# Patient Record
Sex: MALE | Race: AMERICAN INDIAN OR ALASKA NATIVE
[De-identification: names, ages, dates, MRNs, and addresses within clinical notes are randomized per-mention and may not be internally consistent; named-entity substitution may affect disease eponyms.]

---

## 2019-01-15 ENCOUNTER — HOSPITAL ENCOUNTER (INPATIENT)
Dept: HOSPITAL 5 - ED | Age: 29
LOS: 3 days | Discharge: HOME | DRG: 378 | End: 2019-01-18
Attending: INTERNAL MEDICINE | Admitting: INTERNAL MEDICINE
Payer: SELF-PAY

## 2019-01-15 DIAGNOSIS — R19.7: ICD-10-CM

## 2019-01-15 DIAGNOSIS — K92.0: Primary | ICD-10-CM

## 2019-01-15 DIAGNOSIS — B19.10: ICD-10-CM

## 2019-01-15 PROCEDURE — 85610 PROTHROMBIN TIME: CPT

## 2019-01-15 PROCEDURE — C9113 INJ PANTOPRAZOLE SODIUM, VIA: HCPCS

## 2019-01-15 PROCEDURE — 93010 ELECTROCARDIOGRAM REPORT: CPT

## 2019-01-15 PROCEDURE — 93005 ELECTROCARDIOGRAM TRACING: CPT

## 2019-01-15 PROCEDURE — 83690 ASSAY OF LIPASE: CPT

## 2019-01-15 PROCEDURE — 85025 COMPLETE CBC W/AUTO DIFF WBC: CPT

## 2019-01-15 PROCEDURE — 86707 HEPATITIS BE ANTIBODY: CPT

## 2019-01-15 PROCEDURE — 85018 HEMOGLOBIN: CPT

## 2019-01-15 PROCEDURE — 81001 URINALYSIS AUTO W/SCOPE: CPT

## 2019-01-15 PROCEDURE — 74177 CT ABD & PELVIS W/CONTRAST: CPT

## 2019-01-15 PROCEDURE — 36415 COLL VENOUS BLD VENIPUNCTURE: CPT

## 2019-01-15 PROCEDURE — 87806 HIV AG W/HIV1&2 ANTB W/OPTIC: CPT

## 2019-01-15 PROCEDURE — 86705 HEP B CORE ANTIBODY IGM: CPT

## 2019-01-15 PROCEDURE — 80053 COMPREHEN METABOLIC PANEL: CPT

## 2019-01-15 PROCEDURE — 80074 ACUTE HEPATITIS PANEL: CPT

## 2019-01-15 PROCEDURE — 80320 DRUG SCREEN QUANTALCOHOLS: CPT

## 2019-01-15 PROCEDURE — 99406 BEHAV CHNG SMOKING 3-10 MIN: CPT

## 2019-01-15 PROCEDURE — G0480 DRUG TEST DEF 1-7 CLASSES: HCPCS

## 2019-01-15 PROCEDURE — 85014 HEMATOCRIT: CPT

## 2019-01-15 PROCEDURE — 87350 HEPATITIS BE AG IA: CPT

## 2019-01-15 PROCEDURE — 82550 ASSAY OF CK (CPK): CPT

## 2019-01-15 PROCEDURE — 87517 HEPATITIS B DNA QUANT: CPT

## 2019-01-16 LAB
ALBUMIN SERPL-MCNC: 3.3 G/DL (ref 3.9–5)
ALBUMIN SERPL-MCNC: 3.6 G/DL (ref 3.9–5)
ALT SERPL-CCNC: 1432 UNITS/L (ref 7–56)
ALT SERPL-CCNC: 1514 UNITS/L (ref 7–56)
BASOPHILS # (AUTO): 0.1 K/MM3 (ref 0–0.1)
BASOPHILS NFR BLD AUTO: 0.6 % (ref 0–1.8)
BILIRUB UR QL STRIP: (no result)
BLOOD UR QL VISUAL: (no result)
BUN SERPL-MCNC: 7 MG/DL (ref 9–20)
BUN SERPL-MCNC: 9 MG/DL (ref 9–20)
BUN/CREAT SERPL: 13 %
BUN/CREAT SERPL: 9 %
CALCIUM SERPL-MCNC: 8.3 MG/DL (ref 8.4–10.2)
CALCIUM SERPL-MCNC: 8.9 MG/DL (ref 8.4–10.2)
EOSINOPHIL # BLD AUTO: 0.4 K/MM3 (ref 0–0.4)
EOSINOPHIL NFR BLD AUTO: 4.5 % (ref 0–4.3)
HCT VFR BLD CALC: 36.9 % (ref 35.5–45.6)
HCT VFR BLD CALC: 39.2 % (ref 35.5–45.6)
HCT VFR BLD CALC: 41 % (ref 35.5–45.6)
HEMOLYSIS INDEX: 22
HEMOLYSIS INDEX: 8
HGB BLD-MCNC: 12.5 GM/DL (ref 11.8–15.2)
HGB BLD-MCNC: 13.1 GM/DL (ref 11.8–15.2)
HGB BLD-MCNC: 13.5 GM/DL (ref 11.8–15.2)
INR PPP: 1.18 (ref 0.87–1.13)
LYMPHOCYTES # BLD AUTO: 2.4 K/MM3 (ref 1.2–5.4)
LYMPHOCYTES NFR BLD AUTO: 27.3 % (ref 13.4–35)
MCHC RBC AUTO-ENTMCNC: 33 % (ref 32–34)
MCV RBC AUTO: 99 FL (ref 84–94)
MONOCYTES # (AUTO): 1.3 K/MM3 (ref 0–0.8)
MONOCYTES % (AUTO): 15.2 % (ref 0–7.3)
MUCOUS THREADS #/AREA URNS HPF: (no result) /HPF
PH UR STRIP: 7 [PH] (ref 5–7)
PLATELET # BLD: 272 K/MM3 (ref 140–440)
PROT UR STRIP-MCNC: (no result) MG/DL
RBC # BLD AUTO: 4.16 M/MM3 (ref 3.65–5.03)
RBC #/AREA URNS HPF: 3 /HPF (ref 0–6)
UROBILINOGEN UR-MCNC: 4 MG/DL (ref ?–2)
WBC #/AREA URNS HPF: 4 /HPF (ref 0–6)

## 2019-01-16 RX ADMIN — PANTOPRAZOLE SODIUM SCH MG: 40 INJECTION, POWDER, FOR SOLUTION INTRAVENOUS at 10:43

## 2019-01-16 RX ADMIN — MORPHINE SULFATE PRN MG: 4 INJECTION, SOLUTION INTRAMUSCULAR; INTRAVENOUS at 10:43

## 2019-01-16 RX ADMIN — PANTOPRAZOLE SODIUM SCH MG: 40 INJECTION, POWDER, FOR SOLUTION INTRAVENOUS at 21:09

## 2019-01-16 RX ADMIN — MORPHINE SULFATE PRN MG: 4 INJECTION, SOLUTION INTRAMUSCULAR; INTRAVENOUS at 21:09

## 2019-01-16 NOTE — CAT SCAN REPORT
FINAL REPORT



EXAM:  CT ABDOMEN PELVIS W CON



HISTORY:  epigastric pain 



TECHNIQUE:  Routine axial imaging was obtained of the abdomen and pelvis following the intravenous in
jection of 100 cc of Omnipaque 300. Delayed imaging was obtained through the kidneys ureters and blad
maribel. Sagittal and coronal reconstructions were reviewed.



FINDINGS:  

The lung bases are clear. Pleural fluid is not seen.



The liver, gallbladder, biliary tree, pancreas, spleen, and adrenal glands appear normal. The kidneys
 reveal an 8 millimeter cortical cyst in the upper pole of left kidney. There is no evidence of hydro
nephrosis. The abdominal aorta is normal in caliber. The portal vein enhances normally. The bowel loo
ps are normal in caliber and course. The appendix is not seen. There is no evidence of free fluid or 
adenopathy. In the pelvis the prostate gland and bladder appear normal. The skeletal structures appea
r well maintained.



IMPRESSION:  

No acute process in the abdomen and pelvis.



Appendix not identified. No evidence of any inflammatory process in the right lower quadrant.



Small cortical cyst upper pole left kidney.

## 2019-01-16 NOTE — GASTROENTEROLOGY CONSULTATION
Addendum entered and electronically signed by NEHA REYES MD  01/16/19 18:06: 





Patient seen and examined on1/16/2018. Agree with A/P and recommendations as 

stated. 


27 yo male with no pmh presenting with diffuse abdominal pain and 

nausea/vomiting after having Chinese food. Found to have acute viral hepatitis B

based on serologies. H/H stable and no additional hematemesis. 


- monitor liver enzymes and H/H. 


- cont with PPI. 


- ID on board. agree with checking also for HIV but patient refused. 


- will follow. 





Original Note:








History of Present Illness





- Reason for Consult


Consult date: 01/16/19


hematemesis


Requesting physician: SHABANA CHAU





- History of Present Illness


Patient is a 29 y/o male with no significant PMH who presented to ED with c/o 

diffuse abd pain described as cramping, diarrhea, and N/V x 3-4 days after ea

ting on Chinese food on Friday. Upon admission, his LFTs were found to be 

significantly elevated with hepatitis panel revealing hepatitis Bs Antigen and 

hepatitis B core IgM Ab positive and was admitted for an acute hep B infection. 

ID consult pending. Abd CT showed no acute process (liver normal). He also c/o 

an episode of hematemesis to which GI has been consulted. H/H WNL. This 

afternoon patient was resting on stretcher w/o acute distress. He reports 1 

episode of scant amount of hematemesis yesterday after multiple episodes of N/V,

which has now improved. No melena or hematochezia. Admits to some continued 

intermittent abd cramping and diarrhea which is also improving. Denies fever, 

CP, SOB, dizziness, wt loss, dysphagia, odynophagia, jaundice, or constipation. 

No prior hx of liver disease or PUD. No previous EGD. No NSAID use. No Fhx of 

liver disease. No IV drug use. Admits to same sex intercourse. Last tattoo 

received 11/2018. 








Past History


Past Medical History: No medical history


Past Surgical History: No surgical history, Other (left ankle)


Social history: denies: smoking, alcohol abuse


Family history: no significant family history





Medications and Allergies


                                    Allergies











Allergy/AdvReac Type Severity Reaction Status Date / Time


 


No Known Allergies Allergy   Unverified 01/15/19 23:34











                                Home Medications











 Medication  Instructions  Recorded  Confirmed  Last Taken  Type


 


No Known Home Medications [No  01/16/19 01/16/19 Unknown History





Reported Home Medications]     











Active Meds: 


Active Medications





Acetaminophen (Tylenol)  650 mg PO Q4H PRN


   PRN Reason: Fever >101


Dextrose/Sodium Chloride (D5/0.45ns)  1,000 mls @ 125 mls/hr IV AS DIRECT CHANDA


   Stop: 01/16/19 14:00


   Last Admin: 01/16/19 06:26 Dose:  125 mls/hr


   Documented by: 


Morphine Sulfate (Morphine)  2 mg IV Q4H PRN


   PRN Reason: Pain, Moderate (4-6)


   Last Admin: 01/16/19 10:43 Dose:  2 mg


   Documented by: 


Ondansetron HCl (Zofran)  4 mg IV Q8H PRN


   PRN Reason: Nausea And Vomiting


Pantoprazole Sodium (Protonix)  40 mg IV BID [facility]


   Last Admin: 01/16/19 10:43 Dose:  40 mg


   Documented by: 





medications reviewed/updated as required





Review of Systems





- Review of Systems


All systems: negative


Gastrointestinal: abdominal pain (cramping), nausea, vomiting, hematemesis, no 

melena, no hematochezia





Exam





- Constitutional


Vital Signs: 


                                        











Temp Pulse Resp BP Pulse Ox


 


 98.5 F   55 L  16   113/51   99 


 


 01/16/19 04:39  01/16/19 08:21  01/16/19 08:21  01/16/19 08:21  01/16/19 08:21











General appearance: no acute distress





- EENT


Eyes: PERRL, EOM intact


ENT: hearing intact





- Respiratory


Respiratory: bilateral: CTA





- Cardiovascular


Rhythm: other (bradycardia)





- Gastrointestinal


General gastrointestinal: Present: soft, non-tender, non-distended, normal bowel

sounds





- Neurologic


Neurological: alert and oriented x3





- Labs


CBC & Chem 7: 


                                 01/16/19 05:50





                                 01/15/19 23:43


Lab Results: 


                         Laboratory Results - last 24 hr











  01/15/19 01/15/19 01/15/19





  23:43 23:43 Unknown


 


WBC  8.7  


 


RBC  4.16  


 


Hgb  13.5  


 


Hct  41.0  


 


MCV  99 H  


 


MCH  32  


 


MCHC  33  


 


RDW  14.3  


 


Plt Count  272  


 


Lymph % (Auto)  27.3  


 


Mono % (Auto)  15.2 H  


 


Eos % (Auto)  4.5 H  


 


Baso % (Auto)  0.6  


 


Lymph #  2.4  


 


Mono #  1.3 H  


 


Eos #  0.4  


 


Baso #  0.1  


 


Seg Neutrophils %  52.4  


 


Seg Neutrophils #  4.5  


 


Sodium   141 


 


Potassium   4.1 


 


Chloride   103.3 


 


Carbon Dioxide   28 


 


Anion Gap   14 


 


BUN   9 


 


Creatinine   0.7 L 


 


Estimated GFR   > 60 


 


BUN/Creatinine Ratio   13 


 


Glucose   91 


 


Calcium   8.9 


 


Total Bilirubin   3.30 H 


 


AST   979 H 


 


ALT   1514 H 


 


Alkaline Phosphatase   207 H 


 


Total Creatine Kinase   


 


Total Protein   6.6 


 


Albumin   3.6 L 


 


Albumin/Globulin Ratio   1.2 


 


Lipase   


 


Urine Color    Rut


 


Urine Turbidity    Clear


 


Urine pH    7.0


 


Ur Specific Gravity    1.015


 


Urine Protein    <15 mg/dl


 


Urine Glucose (UA)    Neg


 


Urine Ketones    Neg


 


Urine Blood    Neg


 


Urine Nitrite    Neg


 


Urine Bilirubin    Neg


 


Urine Urobilinogen    4.0


 


Ur Leukocyte Esterase    Tr


 


Urine WBC (Auto)    4.0


 


Urine RBC (Auto)    3.0


 


U Epithel Cells (Auto)    2.0


 


Urine Mucus    1+


 


Hepatitis A IgM Ab   


 


Hep Bs Antigen   


 


Hep B Core IgM Ab   


 


Hepatitis C Antibody   














  01/16/19 01/16/19 01/16/19





  00:00 01:09 01:09


 


WBC   


 


RBC   


 


Hgb   


 


Hct   


 


MCV   


 


MCH   


 


MCHC   


 


RDW   


 


Plt Count   


 


Lymph % (Auto)   


 


Mono % (Auto)   


 


Eos % (Auto)   


 


Baso % (Auto)   


 


Lymph #   


 


Mono #   


 


Eos #   


 


Baso #   


 


Seg Neutrophils %   


 


Seg Neutrophils #   


 


Sodium   


 


Potassium   


 


Chloride   


 


Carbon Dioxide   


 


Anion Gap   


 


BUN   


 


Creatinine   


 


Estimated GFR   


 


BUN/Creatinine Ratio   


 


Glucose   


 


Calcium   


 


Total Bilirubin   


 


AST   


 


ALT   


 


Alkaline Phosphatase   


 


Total Creatine Kinase   99 


 


Total Protein   


 


Albumin   


 


Albumin/Globulin Ratio   


 


Lipase  196 H  


 


Urine Color   


 


Urine Turbidity   


 


Urine pH   


 


Ur Specific Gravity   


 


Urine Protein   


 


Urine Glucose (UA)   


 


Urine Ketones   


 


Urine Blood   


 


Urine Nitrite   


 


Urine Bilirubin   


 


Urine Urobilinogen   


 


Ur Leukocyte Esterase   


 


Urine WBC (Auto)   


 


Urine RBC (Auto)   


 


U Epithel Cells (Auto)   


 


Urine Mucus   


 


Hepatitis A IgM Ab    Non-reactive


 


Hep Bs Antigen    Reactive


 


Hep B Core IgM Ab    Reactive A


 


Hepatitis C Antibody    Non-reactive














  01/16/19





  05:50


 


WBC 


 


RBC 


 


Hgb  12.5


 


Hct  36.9


 


MCV 


 


MCH 


 


MCHC 


 


RDW 


 


Plt Count 


 


Lymph % (Auto) 


 


Mono % (Auto) 


 


Eos % (Auto) 


 


Baso % (Auto) 


 


Lymph # 


 


Mono # 


 


Eos # 


 


Baso # 


 


Seg Neutrophils % 


 


Seg Neutrophils # 


 


Sodium 


 


Potassium 


 


Chloride 


 


Carbon Dioxide 


 


Anion Gap 


 


BUN 


 


Creatinine 


 


Estimated GFR 


 


BUN/Creatinine Ratio 


 


Glucose 


 


Calcium 


 


Total Bilirubin 


 


AST 


 


ALT 


 


Alkaline Phosphatase 


 


Total Creatine Kinase 


 


Total Protein 


 


Albumin 


 


Albumin/Globulin Ratio 


 


Lipase 


 


Urine Color 


 


Urine Turbidity 


 


Urine pH 


 


Ur Specific Gravity 


 


Urine Protein 


 


Urine Glucose (UA) 


 


Urine Ketones 


 


Urine Blood 


 


Urine Nitrite 


 


Urine Bilirubin 


 


Urine Urobilinogen 


 


Ur Leukocyte Esterase 


 


Urine WBC (Auto) 


 


Urine RBC (Auto) 


 


U Epithel Cells (Auto) 


 


Urine Mucus 


 


Hepatitis A IgM Ab 


 


Hep Bs Antigen 


 


Hep B Core IgM Ab 


 


Hepatitis C Antibody 














Assessment and Plan


1.hematemesis


-H/H WNL (12.5/36.9)


-continue to monitor H/H and transfuse as needed


-hold blood thinning medications


-patient reports diffuse abd cramping with associated diarrhea and N/V x 3-4 

days after eating Chinese food on Friday with N/V and diarrhea improving. He 

reports 1 episode of hematemesis with a scant amount of bright red blood 

yesterday after multiple episodes of vomiting with non-bloody emesis. No further

signs of bleeding overnight or today. No melena or hematochezia. 


-currently HD stable


-etiology-likely 2/2 M-W tear vs esophagitis vs other


-no plans for EGD at this time unless overt bleeding develops given no clinical 

evidence of significant GI bleeding 


-okay to start on clears


-continue PPI and supportive care


2.acute hepatitis B infection


-elevated LFTs ([place] 3.30, , ALT 1514, alk phos 207)


-hepatitis Bs Ag and hepatitis B core IgM Ab positive


-abd CT w/o acute findings (liver normal)


-ID consult pending


-further hep B serologies and INR pending


-continue to trend labs and supportive care


-will follow

## 2019-01-16 NOTE — HISTORY AND PHYSICAL REPORT
CHIEF COMPLAINT:  Abdominal pain.  Other complaint includes hematemesis, nausea,

vomiting and diarrhea.



HISTORY OF PRESENT ILLNESS:  The patient is a 28-year-old male who has been

having abdominal pain going on for a few days and was associated with nausea,

vomiting and diarrhea.  Also, the patient complained about having hematemesis 3

times before presenting to the Emergency Room.  There is no history of

hematochezia, no history of melena.  The patient also denies history of fever,

shortness of breath or chest pain and said that the pain is mainly in the

epigastric area with severity of about 10/10.  Pain is sharp in consistency and

not affected by movement and the pain does not radiate.



PAST MEDICAL HISTORY:  Unremarkable.



PAST SURGICAL HISTORY:  Pertinent for left ankle surgery.



FAMILY HISTORY:  Noncontributory.



SOCIAL HISTORY:  The patient does not smoke, does not drink alcohol and does not

use illicit drugs.



MEDICATIONS:  The patient is not on any medication.



ALLERGIES:  There are no known drug allergies.



REVIEW OF SYSTEMS:

CONSTITUTIONAL:  There is no fever, no chills, no diaphoresis.

HEENT:  There is no headache or sore throat.

CARDIOVASCULAR SYSTEM:  There is no chest pain or orthopnea.

RESPIRATORY SYSTEM:  There is no shortness of breath or cough.

GASTROINTESTINAL SYSTEM:  Abdominal pain is present.  Nausea, vomiting and

diarrhea present.  Hematemesis present.  No hematochezia, no melena, no

constipation.

NEUROLOGICAL SYSTEM:  There is no numbness, no dizziness, no altered mental

status.

MUSCULOSKELETAL SYSTEM:  There is no joint pain or swelling.

DERMATOLOGICAL SYSTEM:  There is no skin rash or itching.

GENITOURINARY SYSTEM:  There is no dysuria, hematuria or flank pain.

Rest of system review is normal.



PHYSICAL EXAMINATION:

GENERAL:  At the time of exam, the patient was found to be alert, oriented x 3,

not in acute distress.

VITAL SIGNS:  At the initial time of presentation show a temperature of 98

degrees Fahrenheit, pulse of ____, blood pressure 114/70, normal O2 sats on room

air.

HEENT:  Shows pupils to be equal, round, reactive to light and accommodation. 

Extraocular muscles are intact.

NECK:  Supple with no JVD or carotid bruit.

CARDIOVASCULAR SYSTEM:  Shows normal first and second heart sounds with no

gallops or murmur.

RESPIRATORY SYSTEM:  Shows good air entry on both sides of the lungs with no

abnormal breath sounds.

GASTROINTESTINAL SYSTEM:  Showed abdomen to be full, soft with tenderness in the

suprapubic area and left lower quadrant as well as right lower quadrant.  There

is no organomegaly.  Bowel sound is normal.  There is no rebound tenderness or

rigidity.

NEUROLOGIC:  Shows no focal deficit.

MUSCULOSKELETAL SYSTEM:  Shows no joint swelling or tenderness.

DERMATOLOGICAL SYSTEM:  Shows no skin rash.

GENITOURINARY SYSTEM:  Showing no costovertebral angle tenderness.



PERTINENT LABORATORY AND IMAGING STUDY:  The patient had CT of the abdomen and

pelvis done which shows no active process in the abdomen and pelvis.  The

appendix was not identified and there was no evidence of any inflammatory

process in the right lower quadrant.  Labs results; the patient had CBC done

with normal white count, normal hemoglobin and normal hematocrit.  CBC

differential showed high monocyte count of 15.2% and high eosinophilic count of

4.5%.  The patient's chemistry showed elevated total bilirubin of 3.3 with

elevated liver transaminases showing high AST of 979 and high ALT of 1514 with

elevated alkaline phosphatase of 207.  The patient's albumin level is low with a

value of 3.6 and lipase level is high with a value of 196.  The patient's

urinalysis showed trace urine leukocyte esterase, negative urine nitrites,

normal urine WBC, and no bacteria was seen.  The patient has serology done with

nonreactive hepatitis A test and reactive hepatitis B surface antigen as well as

reactive hepatitis B core IgM antibody and nonreactive hepatitis C.



DIAGNOSES:  1. Acute hepatitis B infection. 2. Hemetemesis



PLAN OF ACTION:

1.  The patient will be admitted to medical/surgical [facility].

2.  The patient will be on IV normal saline at 125 mL an hour.

3.  The patient will be on IV morphine 2 mg every 4 hours as needed for pain and

IV Zofran 4 mg every 8 hours for nausea and vomiting.

4.  The patient will have Infectious Disease consult with Dr. Silvia Shane for initiation of antihepatitis B treatment.

5.  The patient will also have Gastroenterology consult with Dr. Venkatesh Ash of

[facility] because of hematemesis.

6.  The patient will have hemoglobin and hematocrit checked every 6 hours x 2

more levels.

7.  The patient will remain n.p.o. until seen by the gastroenterologist.

8.  The patient will be on Protonix 40 mg IV every 12 hours and will be on IV

Zofran 4 mg every 8 hours for nausea and vomiting.





DD: 01/16/2019 04:40

DT: 01/16/2019 05:13

JOB# [id number]  [id number]

OCN/NTS

MTDD

## 2019-01-16 NOTE — EMERGENCY DEPARTMENT REPORT
ED Abdominal Pain HPI





- General


Chief Complaint: Abdominal Pain


Stated Complaint: CHEST PAIN SOB BLOOD IN VOMIT


Time Seen by Provider: 01/16/19 00:01


Source: patient


Mode of arrival: Ambulatory


Limitations: No Limitations





- History of Present Illness


Initial Comments: 





28-year-old -American male has relocated to [facility] from [place] 

[place] comes in complaining of nausea vomiting diarrhea with diffuse abdominal 

pain and chest pain.  Patient reports that he has vomited 3 times today and has 

had multiple episodes of diarrhea with nausea.  Patient reports he is taking 

nothing for his discomfort.  Patient reports that he had eaten Chinese food on 

Friday and He started to feel bad.  Patient reports he was seen in [facility] a month ago for the same issues.  Patient has not followed up 

with a primary care provider.


-: days(s) (3)


Location: diffuse


Radiation: epigastric


Severity: severe


Severity scale (0 -10): 10


Quality: cramping, sharp


Consistency: constant


Improves With: nothing


Worsens With: nothing


Context: possible food poisoning


Treatments Prior to Arrival: other (none)





- Related Data


                                    Allergies











Allergy/AdvReac Type Severity Reaction Status Date / Time


 


No Known Allergies Allergy   Unverified 01/15/19 23:34














ED Review of Systems


ROS: 


Stated complaint: CHEST PAIN SOB BLOOD IN VOMIT


Other details as noted in HPI





Comment: All other systems reviewed and negative


Gastrointestinal: abdominal pain, nausea, vomiting


Neurological: headache





ED Past Medical Hx





- Past Medical History


Previous Medical History?: No





- Surgical History


Past Surgical History?: Yes


Additional Surgical History: left ankle surgery





- Social History


Smoking Status: Never Smoker


Substance Use Type: None





ED Physical Exam





- General


Limitations: No Limitations


General appearance: alert, in no apparent distress





- Head


Head exam: Present: atraumatic, normocephalic





- Eye


Eye exam: Present: normal appearance





- ENT


ENT exam: Present: mucous membranes moist





- Neck


Neck exam: Present: normal inspection





- Respiratory


Respiratory exam: Present: normal lung sounds bilaterally.  Absent: respiratory 

distress





- Cardiovascular


Cardiovascular Exam: Present: regular rate, normal rhythm.  Absent: systolic 

murmur, diastolic murmur, rubs, gallop





- GI/Abdominal


GI/Abdominal exam: Present: soft, tenderness





- Extremities Exam


Extremities exam: Present: normal inspection





- Back Exam


Back exam: Present: normal inspection





- Neurological Exam


Neurological exam: Present: alert, oriented X3





- Psychiatric


Psychiatric exam: Present: normal affect, normal mood





- Skin


Skin exam: Present: warm, dry, intact, normal color.  Absent: rash





ED Course





                                   Vital Signs











  01/15/19





  23:30


 


Temperature 98.0 F


 


Respiratory 16





Rate 


 


Blood Pressure 114/70














ED Medical Decision Making





- Lab Data








                                   Lab Results











  01/15/19 01/15/19 01/15/19 Range/Units





  23:43 23:43 Unknown 


 


WBC  8.7    (4.5-11.0)  K/mm3


 


RBC  4.16    (3.65-5.03)  M/mm3


 


Hgb  13.5    (11.8-15.2)  gm/dl


 


Hct  41.0    (35.5-45.6)  %


 


MCV  99 H    (84-94)  fl


 


MCH  32    (28-32)  pg


 


MCHC  33    (32-34)  %


 


RDW  14.3    (13.2-15.2)  %


 


Plt Count  272    (140-440)  K/mm3


 


Lymph % (Auto)  27.3    (13.4-35.0)  %


 


Mono % (Auto)  15.2 H    (0.0-7.3)  %


 


Eos % (Auto)  4.5 H    (0.0-4.3)  %


 


Baso % (Auto)  0.6    (0.0-1.8)  %


 


Lymph #  2.4    (1.2-5.4)  K/mm3


 


Mono #  1.3 H    (0.0-0.8)  K/mm3


 


Eos #  0.4    (0.0-0.4)  K/mm3


 


Baso #  0.1    (0.0-0.1)  K/mm3


 


Seg Neutrophils %  52.4    (40.0-70.0)  %


 


Seg Neutrophils #  4.5    (1.8-7.7)  K/mm3


 


Sodium   141   (137-145)  mmol/L


 


Potassium   4.1   (3.6-5.0)  mmol/L


 


Chloride   103.3   ()  mmol/L


 


Carbon Dioxide   28   (22-30)  mmol/L


 


Anion Gap   14   mmol/L


 


BUN   9   (9-20)  mg/dL


 


Creatinine   0.7 L   (0.8-1.5)  mg/dL


 


Estimated GFR   > 60   ml/min


 


BUN/Creatinine Ratio   13   %


 


Glucose   91   ()  mg/dL


 


Calcium   8.9   (8.4-10.2)  mg/dL


 


Total Bilirubin   3.30 H   (0.1-1.2)  mg/dL


 


AST   979 H   (5-40)  units/L


 


ALT   1514 H   (7-56)  units/L


 


Alkaline Phosphatase   207 H   ()  units/L


 


Total Protein   6.6   (6.3-8.2)  g/dL


 


Albumin   3.6 L   (3.9-5)  g/dL


 


Albumin/Globulin Ratio   1.2   %


 


Urine Color    Rut  (Yellow)  


 


Urine Turbidity    Clear  (Clear)  


 


Urine pH    7.0  (5.0-7.0)  


 


Ur Specific Gravity    1.015  (1.003-1.030)  


 


Urine Protein    <15 mg/dl  (Negative)  mg/dL


 


Urine Glucose (UA)    Neg  (Negative)  mg/dL


 


Urine Ketones    Neg  (Negative)  mg/dL


 


Urine Blood    Neg  (Negative)  


 


Urine Nitrite    Neg  (Negative)  


 


Urine Bilirubin    Neg  (Negative)  


 


Urine Urobilinogen    4.0  (<2.0)  mg/dL


 


Ur Leukocyte Esterase    Tr  (Negative)  


 


Urine WBC (Auto)    4.0  (0.0-6.0)  /HPF


 


Urine RBC (Auto)    3.0  (0.0-6.0)  /HPF


 


U Epithel Cells (Auto)    2.0  (0-13.0)  /HPF


 


Urine Mucus    1+  /HPF














- EKG Data


EKG shows normal: sinus rhythm


Rate: normal





- Radiology Data


Radiology results: report reviewed





- Medical Decision Making





FINAL REPORT 





EXAM: CT ABDOMEN PELVIS W CON 





HISTORY: epigastric pain 





TECHNIQUE: Routine axial imaging was obtained of the abdomen and pelvis fol

lowing the intravenous 


injection of 100 cc of Omnipaque 300. Delayed imaging was obtained through the 

kidneys ureters and 


bladder. Sagittal and coronal reconstructions were reviewed. 





FINDINGS: 


The lung bases are clear. Pleural fluid is not seen. 





The liver, gallbladder, biliary tree, pancreas, spleen, and adrenal glands 

appear normal. The 


kidneys reveal an 8 millimeter cortical cyst in the upper pole of left kidney. 

There is no evidence 


of hydronephrosis. The abdominal aorta is normal in caliber. The portal vein 

enhances normally. The 


bowel loops are normal in caliber and course. The appendix is not seen. There is

no evidence of free


fluid or adenopathy. In the pelvis the prostate gland and bladder appear normal.

The skeletal 


structures appear well maintained. 





IMPRESSION: 


No acute process in the abdomen and pelvis. 





Appendix not identified. No evidence of any inflammatory process in the right 

lower quadrant. 





Small cortical cyst upper pole left kidney. 











Transcribed By: RB 


Dictated By: MAGED QUILES MD 


Electronically Authenticated By: MAGED QUILES MD 


Signed Date/Time: 01/16/19 0149 











DD/DT: 01/16/19 0152 


TD/TT: 01/16/19 0152


Critical care attestation.: 


If time is entered above; I have spent that time in minutes in the direct care 

of this critically ill patient, excluding procedure time.








ED Disposition


Clinical Impression: 


 Acute hepatitis B





Disposition: DC-09 OP ADMIT IP TO THIS HOSP


Is pt being admited?: Yes


Does the pt Need Aspirin: No


Condition: Stable


Referrals: 


PRIMARY CARE,MD [Primary Care Provider] - 3-5 Days

## 2019-01-16 NOTE — CONSULTATION
History of Present Illness





- Reason for Consult


Consult date: 01/16/19


Acute Hepatitis B


Requesting physician: SHABANA CHAU





- History of Present Illness


The patient is a 28-year-old male with no significant past medical history who 

developed abdominal cramping, nausea and vomiting about 2-3 days prior, finally 

he had an episode of scant hematemesis and hence he came to the hospital. Here, 

he was noted to have a significant transaminitis and acute hepatitis panel was 

suggestive of an acute hepatitis B infection. Hence infectious diseases was 

consulted. He denies any fevers or chills. He currently is feeling well with no 

more episodes of hematemesis.





He denies any IV or recreational drug use, drinks alcohol socially, denies any 

smoking. He admits to MSM behavior. States his last HIV test was in December 2018 and was negative. He moved here from [place] about 6 months ago and 

has been with a stable partner. He is unsure if he has been vaccinated for 

Hepatitis A in the past.





Review of Systems: 


General: no fevers,chills or rigors


HEENT: no new visual disturbance


Respiratory: No cough, sputum, hemoptysis or shortness of breath


Cardiovascular: No chest pain, syncope


Gastrointestinal: + nausea, vomiting and hematemesis 


Genitourinary: No dysuria or hematuria


Musculoskeletal: No new or worsening neck pain or back pain 


Neurologic: No headaches, seizures


Hematologic: No easy bruising or bleeding


Endocrine: No night sweats or acute weight loss


Skin: negative for rash, jaundice


Psychiatric: No suicidal or homicidal ideation








Past History


Past Medical History: No medical history


Past Surgical History: No surgical history, Other (left ankle)


Social history: denies: smoking, alcohol abuse


Family history: no significant family history





Medications and Allergies


                                    Allergies











Allergy/AdvReac Type Severity Reaction Status Date / Time


 


No Known Allergies Allergy   Unverified 01/15/19 23:34











                                Home Medications











 Medication  Instructions  Recorded  Confirmed  Last Taken  Type


 


No Known Home Medications [No  01/16/19 01/16/19 Unknown History





Reported Home Medications]     











Active Meds: 


Active Medications





Acetaminophen (Tylenol)  650 mg PO Q4H PRN


   PRN Reason: Fever >101


Morphine Sulfate (Morphine)  2 mg IV Q4H PRN


   PRN Reason: Pain, Moderate (4-6)


   Last Admin: 01/16/19 10:43 Dose:  2 mg


   Documented by: 


Ondansetron HCl (Zofran)  4 mg IV Q8H PRN


   PRN Reason: Nausea And Vomiting


Pantoprazole Sodium (Protonix)  40 mg IV BID CHANDA


   Last Admin: 01/16/19 10:43 Dose:  40 mg


   Documented by: 











Physical Examination





- Physical Exam


Narrative exam: 





Physical Exam: 


Constitutional: Alert, cooperative. No acute distress


Head, Ears, Nose: Normocephalic, atraumatic. External ears, nose normal


Eyes: Conjunctivae/corneas clear. No icterus. No ptosis.


Neck: Supple, no meningeal signs


Oral: dentition fair, no thrush


Cardiovascular: S1, S2 normal. 


Respiratory: Good air entry, clear to auscultation bilaterally


GI: Soft, mild tenderness in upper abdomen; bowel sounds normal. No peritoneal 

signs


Musculoskeletal: No pedal edema, no cyanosis.


Skin: No rash or abscess


Hem/Lymphatic: No palpable cervical or supraclavicular nodes. No lymphangitis


Psych: Mood ok. Affect normal


Neurological: Awake, alert, oriented. No gross abnormality





- Constitutional


Vitals: 


                                   Vital Signs











Temp Pulse Resp BP Pulse Ox


 


 98.3 F   50 L  16   116/64   95 


 


 01/16/19 14:29  01/16/19 14:29  01/16/19 14:29  01/16/19 14:29  01/16/19 14:29








                           Temperature -Last 24 Hours











Temperature                    98.3 F


 


Temperature                    98.5 F


 


Temperature                    98.0 F

















Results





- Labs


CBC & Chem 7: 


                                 01/16/19 13:51





                                 01/16/19 13:51


Labs: 


                              Abnormal lab results











  01/15/19 01/15/19 01/16/19 Range/Units





  23:43 23:43 00:00 


 


MCV  99 H    (84-94)  fl


 


Mono % (Auto)  15.2 H    (0.0-7.3)  %


 


Eos % (Auto)  4.5 H    (0.0-4.3)  %


 


Mono #  1.3 H    (0.0-0.8)  K/mm3


 


PT     (12.2-14.9)  Sec.


 


INR     (0.87-1.13)  


 


BUN     (9-20)  mg/dL


 


Creatinine   0.7 L   (0.8-1.5)  mg/dL


 


Calcium     (8.4-10.2)  mg/dL


 


Total Bilirubin   3.30 H   (0.1-1.2)  mg/dL


 


AST   979 H   (5-40)  units/L


 


ALT   1514 H   (7-56)  units/L


 


Alkaline Phosphatase   207 H   ()  units/L


 


Albumin   3.6 L   (3.9-5)  g/dL


 


Lipase    196 H  (13-60)  units/L


 


Acetaminophen     (10.0-30.0)  ug/mL


 


Hep B Core IgM Ab     (NonReactive)  














  01/16/19 01/16/19 01/16/19 Range/Units





  01:09 13:51 13:51 


 


MCV     (84-94)  fl


 


Mono % (Auto)     (0.0-7.3)  %


 


Eos % (Auto)     (0.0-4.3)  %


 


Mono #     (0.0-0.8)  K/mm3


 


PT   15.4 H   (12.2-14.9)  Sec.


 


INR   1.18 H   (0.87-1.13)  


 


BUN    7 L  (9-20)  mg/dL


 


Creatinine     (0.8-1.5)  mg/dL


 


Calcium    8.3 L  (8.4-10.2)  mg/dL


 


Total Bilirubin    3.00 H  (0.1-1.2)  mg/dL


 


AST    964 H  (5-40)  units/L


 


ALT    1432 H  (7-56)  units/L


 


Alkaline Phosphatase    190 H  ()  units/L


 


Albumin    3.3 L  (3.9-5)  g/dL


 


Lipase     (13-60)  units/L


 


Acetaminophen     (10.0-30.0)  ug/mL


 


Hep B Core IgM Ab  Reactive A    (NonReactive)  














  01/16/19 Range/Units





  13:51 


 


MCV   (84-94)  fl


 


Mono % (Auto)   (0.0-7.3)  %


 


Eos % (Auto)   (0.0-4.3)  %


 


Mono #   (0.0-0.8)  K/mm3


 


PT   (12.2-14.9)  Sec.


 


INR   (0.87-1.13)  


 


BUN   (9-20)  mg/dL


 


Creatinine   (0.8-1.5)  mg/dL


 


Calcium   (8.4-10.2)  mg/dL


 


Total Bilirubin   (0.1-1.2)  mg/dL


 


AST   (5-40)  units/L


 


ALT   (7-56)  units/L


 


Alkaline Phosphatase   ()  units/L


 


Albumin   (3.9-5)  g/dL


 


Lipase   (13-60)  units/L


 


Acetaminophen  < 5.0 L  (10.0-30.0)  ug/mL


 


Hep B Core IgM Ab   (NonReactive)  














- Imaging and Cardiology


CT scan - abdomen: report reviewed, image reviewed (no acute process seen.)





Assessment and Plan





A/P:


28/M with:





#1 Acute hepatitis B infection: As evident by positive hepatitis B surface 

antigen and hepatitis B core IgM antibody with AST and ALT significantly 

elevated. Albumin today is 3.3, INR is 1.1 suggesting good synthetic liver 

function. No evidence of fulminant hepatic failure at this time. Risk factor is 

MSM behavior.





About 90% of acutely acquired hepatitis B infections in healthy adults may be 

self-limiting and can spontaneously clear. There is currently no indication for 

antiviral therapy. He can be followed over time in the infectious disease clinic

with regular monitoring of AST, ALT, hepatitis E antigen and hepatitis B DNA PCR

and depending on these, a treatment decision can be made.





He should be immunized for hepatitis A, patient refusing at this time, stating 

he needs time to process this information. Also check HIV status but patient is 

refusing at this time, stating he had a negative test in Dec 2018.





Recs:


- no role for anti-viral therapy at this time


- About 90% of acutely acquired hepatitis B infections in healthy adults may be 

self-limiting and can spontaneously clear. There is currently no indication for 

antiviral therapy. He can be followed over time in the infectious disease clinic

with regular monitoring of AST, ALT, hepatitis E antigen and hepatitis B DNA 

PCR.


- He should be immunized for hepatitis A, patient refusing at this time, stating

he needs time to process this information.


- Also check HIV status but patient is refusing at this time, stating he had a 

negative test in Dec 2018.





Will follow. Please call with questions.





[facility] 


C: [phone]


O: [phone]


F: [phone]

## 2019-01-17 LAB
ALBUMIN SERPL-MCNC: 3.4 G/DL (ref 3.9–5)
ALT SERPL-CCNC: 1660 UNITS/L (ref 7–56)
BUN SERPL-MCNC: 8 MG/DL (ref 9–20)
BUN/CREAT SERPL: 11 %
CALCIUM SERPL-MCNC: 8.8 MG/DL (ref 8.4–10.2)
HCT VFR BLD CALC: 38.6 % (ref 35.5–45.6)
HEMOLYSIS INDEX: 26
HGB BLD-MCNC: 12.8 GM/DL (ref 11.8–15.2)

## 2019-01-17 RX ADMIN — MORPHINE SULFATE PRN MG: 4 INJECTION, SOLUTION INTRAMUSCULAR; INTRAVENOUS at 22:46

## 2019-01-17 RX ADMIN — PANTOPRAZOLE SODIUM SCH MG: 40 INJECTION, POWDER, FOR SOLUTION INTRAVENOUS at 10:38

## 2019-01-17 RX ADMIN — PANTOPRAZOLE SODIUM SCH MG: 40 INJECTION, POWDER, FOR SOLUTION INTRAVENOUS at 22:46

## 2019-01-17 NOTE — DISCHARGE SUMMARY
Providers





- Providers


Date of Admission: 


01/16/19 03:50





Attending physician: 


ELY THEODORE MD





                                        





01/16/19 06:00


Consult to Physician [CONS] Routine 


   Comment: CLD DR CAMPA @0835/NO ANS;cld dr ford @0904 informd


   Consulting Provider: SHANNON RENO


   Physician Instructions: 


   Reason For Exam: ACUTE HEPATITIS B INFECTION


Consult to Physician [CONS] Routine 


   Comment: CONSULT CLD TO [facility]/DR REYES ON CALL 0810


   Consulting Provider: ARTUR ASCENCIO


   Physician Instructions: 


   Reason For Exam: HEMETEMESIS











Primary care physician: 


ZARINA TOLEDO








Hospitalization


Condition: Stable


Hospital course: 





28-year-old man who has relocated to [place] from [place].  He presented

 complaining of nausea vomiting diarrhea and diffuse abdominal pain.  He was 

diagnosed with acute hepatitis B infection.  He was treated with IV fluids and 

supportive medications.  He clinically improved, he received gastroenterology 

and infectious disease consultations.  It was recommended that he follow up in 

clinic after discharge.





Diagnosis


Acute hepatitis B infection


Transaminitis





Disposition: DC-01 TO HOME OR SELFCARE


Time spent for discharge: 33 minutes





Core Measure Documentation





- Palliative Care


Palliative Care/ Comfort Measures: Not Applicable





- Core Measures


Any of the following diagnoses?: none





Exam





- Constitutional


Vitals: 


                                        











Temp Pulse Resp BP Pulse Ox


 


 98.2 F   59 L  22   133/69   100 


 


 01/17/19 12:11  01/17/19 12:11  01/17/19 12:11  01/17/19 12:11  01/17/19 12:11











General appearance: Present: no acute distress, well-nourished





- EENT


Eyes: Present: PERRL


ENT: hearing intact, clear oral mucosa





- Neck


Neck: Present: supple, normal ROM





- Respiratory


Respiratory effort: normal


Respiratory: bilateral: CTA





- Cardiovascular


Heart Sounds: Present: S1 & S2.  Absent: rub, click





- Extremities


Extremities: pulses symmetrical, No edema


Peripheral Pulses: within normal limits





- Abdominal


General gastrointestinal: Present: soft, non-tender, non-distended, normal bowel

 sounds


Male genitourinary: Present: normal





- Integumentary


Integumentary: Present: clear, warm, dry





- Musculoskeletal


Musculoskeletal: gait normal, strength equal bilaterally





- Psychiatric


Psychiatric: appropriate mood/affect, intact judgment & insight





- Neurologic


Neurologic: CNII-XII intact, moves all extremities





Plan


Follow up with: 


PRIMARY CARE,MD [Referring] - 3-5 Days


Forms:  Work/School Release Form

## 2019-01-17 NOTE — PROGRESS NOTE
Assessment and Plan





A/P:


28/M with:





#1 Acute hepatitis B infection: As evident by positive hepatitis B surface 

antigen and hepatitis B core IgM antibody with AST and ALT significantly 

elevated. Albumin today is 3.3, INR is 1.1 suggesting good synthetic liver 

function. No evidence of fulminant hepatic failure at this time. Risk factor is 

MSM behavior.





About 90% of acutely acquired hepatitis B infections in healthy adults may be 

self-limiting and can spontaneously clear. There is currently no indication for 

antiviral therapy. He can be followed over time in the infectious disease clinic

with regular monitoring of AST, ALT, hepatitis E antigen and hepatitis B DNA PCR

and depending on these, a treatment decision can be made.





He should be immunized for hepatitis A, patient refusing at this time, stating 

he needs time to process this information. Also check HIV status but patient is 

refusing at this time, stating he had a negative test in Dec 2018.








Recs:


- no role for anti-viral therapy at this time


-Check HIV rapid, patient consented


-[facility], does not have 

insurance or steady employment








[facility] 


M: [phone]


O:[phone]








Subjective


Date of service: 01/17/19


Interval history: 





Patient seen and examined.. Denied pain, fever, SOB or rashes.  Stated that he 

was ready to go home.  Discussed importance of knowing HIV status, consented to 

HIV testing.  





Objective





- Exam


Narrative Exam: 





Constitutional: Alert, cooperative. No acute distress


Head, Ears, Nose: Normocephalic, atraumatic. External ears, nose normal


Eyes: Conjunctivae/corneas clear. No icterus. No ptosis.


Neck: Supple, no meningeal signs


Oral: dentition fair, no thrush


Cardiovascular: S1, S2 normal. 


Respiratory: Good air entry, clear to auscultation bilaterally


GI: Soft, mild tenderness in upper abdomen; bowel sounds normal. No peritoneal 

signs


Musculoskeletal: No pedal edema, no cyanosis.


Skin: No rash or abscess


Hem/Lymphatic: No palpable cervical or supraclavicular nodes. No lymphangitis


Psych: Mood ok. Affect normal


Neurological: Awake, alert, oriented. No gross abnormality








- Constitutional


Vitals: 


                                   Vital Signs











Temp Pulse Resp BP Pulse Ox


 


 98.1 F   58 L  18   121/55   98 


 


 01/17/19 05:57  01/17/19 05:57  01/17/19 05:57  01/17/19 05:57  01/17/19 05:57








                           Temperature -Last 24 Hours











Temperature                    98.1 F


 


Temperature                    97.7 F


 


Temperature                    99.1 F


 


Temperature                    98.7 F


 


Temperature                    98.3 F

















- Labs


CBC & Chem 7: 


                                 01/17/19 01:03





                                 01/16/19 13:51


Labs: 


                              Abnormal lab results











  01/16/19 01/16/19 01/16/19 Range/Units





  13:51 13:51 13:51 


 


PT  15.4 H    (12.2-14.9)  Sec.


 


INR  1.18 H    (0.87-1.13)  


 


BUN   7 L   (9-20)  mg/dL


 


Calcium   8.3 L   (8.4-10.2)  mg/dL


 


Total Bilirubin   3.00 H   (0.1-1.2)  mg/dL


 


AST   964 H   (5-40)  units/L


 


ALT   1432 H   (7-56)  units/L


 


Alkaline Phosphatase   190 H   ()  units/L


 


Albumin   3.3 L   (3.9-5)  g/dL


 


Acetaminophen    < 5.0 L  (10.0-30.0)  ug/mL

## 2019-01-17 NOTE — GASTROENTEROLOGY PROGRESS NOTE
Addendum entered and electronically signed by NEHA REYES MD  01/17/19 18:34: 





Patient seen and examined. Agree with A/P and recommendations as stated. 





Acute hepatitis B infection


Liver enzymes trending up today along with Bilirubin. 


No signs of overt GI bleeding. H/H stable. 


- will trend CMP tomorrow. 


- supportive care. anti-viral not indicated. 





Original Note:








Assessment and Plan


1.hematemesis


-H/H WNL (12.8/38.6)


-continue to monitor H/H and transfuse as needed


-no active signs of bleeding overnight or this am


-etiology-likely 2/2 M-W tear vs esophagitis vs other


-no plans for EGD at this time unless overt bleeding develops given no clinical 

evidence of significant GI bleeding 


-continue PPI and supportive care


2.acute hepatitis B infection


-INR 1.18


-LFTs-slight trend up ([place] 5.60, AST 1173, ALT 1660, alk phos 195)


-hepatitis Bs Ag and hepatitis B core IgM Ab positive (further hep B serologies 

pending)


-HIV pending (negative 12/2018 per pt report)


-abd CT w/o acute findings (liver normal)


-ID following-no recommendations for antiviral therapy at this time (may be 

self-limiting and can spontaneously clear)- further workup/management as 

outpatient


-clinically, patient reports feeling better today with no abd pain or N/V. 

Diarrhea resolved. Tolerating diet. 


-continue to trend labs and supportive care


-will follow











Subjective


Date of service: 01/17/19


Principal diagnosis: hematemesis


Interval history: 


Patient w/o distress. No active signs of bleeding overnight or this morning. 

Denies abd pain or N/V. Diarrhea resolved. Tolerating diet. 








Objective





- Constitutional


Vitals: 


                                        











Temp Pulse Resp BP Pulse Ox


 


 98.2 F   59 L  22   133/69   100 


 


 01/17/19 12:11  01/17/19 12:11  01/17/19 12:11  01/17/19 12:11  01/17/19 12:11











General appearance: no acute distress





- Respiratory


Respiratory: bilateral: CTA





- Cardiovascular


Rhythm: regular


Heart Sounds: Present: S1 & S2





- Gastrointestinal


General gastrointestinal: Present: soft, non-tender, non-distended, normal bowel

sounds





- Neurologic


Neurological: alert and oriented x3





- Labs


CBC & Chem 7: 


                                 01/17/19 01:03





                                 01/17/19 13:48


Labs: 


                         Laboratory Results - last 24 hr











  01/16/19 01/17/19 01/17/19





  13:51 01:03 13:48


 


Hgb   12.8 


 


Hct   38.6 


 


Sodium    137


 


Potassium    3.9


 


Chloride    99.4


 


Carbon Dioxide    26


 


Anion Gap    16


 


BUN    8 L


 


Creatinine    0.7 L


 


Estimated GFR    > 60


 


BUN/Creatinine Ratio    11


 


Glucose    81


 


Calcium    8.8


 


Total Bilirubin    5.60 H


 


AST  964 H   1173 H


 


ALT  1432 H   1660 H


 


Alkaline Phosphatase    195 H


 


Total Protein    7.2


 


Albumin    3.4 L


 


Albumin/Globulin Ratio    0.9

## 2019-01-18 VITALS — SYSTOLIC BLOOD PRESSURE: 115 MMHG | DIASTOLIC BLOOD PRESSURE: 67 MMHG

## 2019-01-18 LAB
ALBUMIN SERPL-MCNC: 3.2 G/DL (ref 3.9–5)
ALT SERPL-CCNC: 1352 UNITS/L (ref 7–56)
BASOPHILS # (AUTO): 0 K/MM3 (ref 0–0.1)
BASOPHILS NFR BLD AUTO: 0.4 % (ref 0–1.8)
BUN SERPL-MCNC: 9 MG/DL (ref 9–20)
BUN/CREAT SERPL: 11 %
CALCIUM SERPL-MCNC: 8.4 MG/DL (ref 8.4–10.2)
EOSINOPHIL # BLD AUTO: 0.8 K/MM3 (ref 0–0.4)
EOSINOPHIL NFR BLD AUTO: 10.6 % (ref 0–4.3)
HCT VFR BLD CALC: 38.2 % (ref 35.5–45.6)
HEMOLYSIS INDEX: 7
HGB BLD-MCNC: 12.8 GM/DL (ref 11.8–15.2)
INR PPP: 1.14 (ref 0.87–1.13)
LYMPHOCYTES # BLD AUTO: 2.4 K/MM3 (ref 1.2–5.4)
LYMPHOCYTES NFR BLD AUTO: 32.1 % (ref 13.4–35)
MCHC RBC AUTO-ENTMCNC: 34 % (ref 32–34)
MCV RBC AUTO: 98 FL (ref 84–94)
MONOCYTES # (AUTO): 1.1 K/MM3 (ref 0–0.8)
MONOCYTES % (AUTO): 14.9 % (ref 0–7.3)
PLATELET # BLD: 219 K/MM3 (ref 140–440)
RBC # BLD AUTO: 3.89 M/MM3 (ref 3.65–5.03)

## 2019-01-18 RX ADMIN — PANTOPRAZOLE SODIUM SCH: 40 INJECTION, POWDER, FOR SOLUTION INTRAVENOUS at 10:44

## 2019-01-18 NOTE — PROGRESS NOTE
Assessment and Plan


Assessment and plan: 





Acute hepatitis B infection


Continue IV fluids, pain meds and antiemetics as needed.


 HIV screening, the patient has been advised that he will need hepatitis A 

vaccination which will be done as an outpatient after he is discharged





History


Interval history: 





Review of systems


Constitutional: No fevers, no malaise, no joint pains





CVS: No chest pain, no orthopnea, no dyspnea on exertion, no pedal edema





GI: No abdominal pain, no diarrhea, no vomiting, no constipation





Respiratory: No shortness of breath, no wheezing, no coughing





Hospitalist Physical





- Physical exam


Narrative exam: 





General.: Appears well, no distress, nontoxic


HEENT: Moist mucous membranes, extraocular muscles intact, no lymphadenopathy


Neck: supple


Cardiac: S1-S2 heard


Lungs: clear to auscultation bilaterally


Abdomen: soft , nontender,  nondistended,  bowel sounds positive


Extremities: no edema clubbing or cyanosis


Skin: no rash or lesions


Neurologic: no gross focal deficits


Psych: calm, and cooperative





- Constitutional


Vitals: 


                                        











Temp Pulse Resp BP Pulse Ox


 


 98.1 F   67   16   115/67   99 


 


 01/18/19 05:09  01/18/19 05:09  01/18/19 05:09  01/18/19 05:09  01/18/19 05:09














Results





- Labs


CBC & Chem 7: 


                                 01/18/19 05:25





                                 01/18/19 05:25


Labs: 


                             Laboratory Last Values











WBC  7.4 K/mm3 (4.5-11.0)   01/18/19  05:25    


 


RBC  3.89 M/mm3 (3.65-5.03)   01/18/19  05:25    


 


Hgb  12.8 gm/dl (11.8-15.2)   01/18/19  05:25    


 


Hct  38.2 % (35.5-45.6)   01/18/19  05:25    


 


MCV  98 fl (84-94)  H  01/18/19  05:25    


 


MCH  33 pg (28-32)  H  01/18/19  05:25    


 


MCHC  34 % (32-34)   01/18/19  05:25    


 


RDW  14.1 % (13.2-15.2)   01/18/19  05:25    


 


Plt Count  219 K/mm3 (140-440)   01/18/19  05:25    


 


Lymph % (Auto)  32.1 % (13.4-35.0)   01/18/19  05:25    


 


Mono % (Auto)  14.9 % (0.0-7.3)  H  01/18/19  05:25    


 


Eos % (Auto)  10.6 % (0.0-4.3)  H  01/18/19  05:25    


 


Baso % (Auto)  0.4 % (0.0-1.8)   01/18/19  05:25    


 


Lymph #  2.4 K/mm3 (1.2-5.4)   01/18/19  05:25    


 


Mono #  1.1 K/mm3 (0.0-0.8)  H  01/18/19  05:25    


 


Eos #  0.8 K/mm3 (0.0-0.4)  H  01/18/19  05:25    


 


Baso #  0.0 K/mm3 (0.0-0.1)   01/18/19  05:25    


 


Seg Neutrophils %  42.0 % (40.0-70.0)   01/18/19  05:25    


 


Seg Neutrophils #  3.1 K/mm3 (1.8-7.7)   01/18/19  05:25    


 


PT  15.0 Sec. (12.2-14.9)  H  01/18/19  05:25    


 


INR  1.14  (0.87-1.13)  H  01/18/19  05:25    


 


Sodium  137 mmol/L (137-145)   01/18/19  05:25    


 


Potassium  3.7 mmol/L (3.6-5.0)   01/18/19  05:25    


 


Chloride  101.0 mmol/L ()   01/18/19  05:25    


 


Carbon Dioxide  27 mmol/L (22-30)   01/18/19  05:25    


 


Anion Gap  13 mmol/L  01/18/19  05:25    


 


BUN  9 mg/dL (9-20)   01/18/19  05:25    


 


Creatinine  0.8 mg/dL (0.8-1.5)   01/18/19  05:25    


 


Estimated GFR  > 60 ml/min  01/18/19  05:25    


 


BUN/Creatinine Ratio  11 %  01/18/19  05:25    


 


Glucose  88 mg/dL ()   01/18/19  05:25    


 


Calcium  8.4 mg/dL (8.4-10.2)   01/18/19  05:25    


 


Total Bilirubin  3.30 mg/dL (0.1-1.2)  H  01/18/19  05:25    


 


AST  842 units/L (5-40)  H  01/18/19  05:25    


 


ALT  1352 units/L (7-56)  H  01/18/19  05:25    


 


Alkaline Phosphatase  188 units/L ()  H  01/18/19  05:25    


 


Total Creatine Kinase  99 units/L ()   01/16/19  01:09    


 


Total Protein  6.1 g/dL (6.3-8.2)  L  01/18/19  05:25    


 


Albumin  3.2 g/dL (3.9-5)  L  01/18/19  05:25    


 


Albumin/Globulin Ratio  1.1 %  01/18/19  05:25    


 


Lipase  196 units/L (13-60)  H  01/16/19  00:00    


 


Urine Color  Rut  (Yellow)   01/15/19  Unknown


 


Urine Turbidity  Clear  (Clear)   01/15/19  Unknown


 


Urine pH  7.0  (5.0-7.0)   01/15/19  Unknown


 


Ur Specific Gravity  1.015  (1.003-1.030)   01/15/19  Unknown


 


Urine Protein  <15 mg/dl mg/dL (Negative)   01/15/19  Unknown


 


Urine Glucose (UA)  Neg mg/dL (Negative)   01/15/19  Unknown


 


Urine Ketones  Neg mg/dL (Negative)   01/15/19  Unknown


 


Urine Blood  Neg  (Negative)   01/15/19  Unknown


 


Urine Nitrite  Neg  (Negative)   01/15/19  Unknown


 


Urine Bilirubin  Neg  (Negative)   01/15/19  Unknown


 


Urine Urobilinogen  4.0 mg/dL (<2.0)   01/15/19  Unknown


 


Ur Leukocyte Esterase  Tr  (Negative)   01/15/19  Unknown


 


Urine WBC (Auto)  4.0 /HPF (0.0-6.0)   01/15/19  Unknown


 


Urine RBC (Auto)  3.0 /HPF (0.0-6.0)   01/15/19  Unknown


 


U Epithel Cells (Auto)  2.0 /HPF (0-13.0)   01/15/19  Unknown


 


Urine Mucus  1+ /HPF  01/15/19  Unknown


 


Acetaminophen  < 5.0 ug/mL (10.0-30.0)  L  01/16/19  13:51    


 


Plasma/Serum Alcohol  < 0.01 % (0-0.07)   01/16/19  13:51    


 


Hepatitis A IgM Ab  Non-reactive  (NonReactive)   01/16/19  01:09    


 


Hep Bs Antigen  Reactive  (Negative)   01/16/19  01:09    


 


Hep B Core IgM Ab  Reactive  (NonReactive)  A  01/16/19  01:09    


 


Hepatitis C Antibody  Non-reactive  (NonReactive)   01/16/19  01:09    


 


HIV 1&2 Antibody Rapid  Non react  (Non React)   01/17/19  13:48    


 


HIV P24 Antigen  Non react  (Non React)   01/17/19  13:48

## 2019-01-18 NOTE — GASTROENTEROLOGY PROGRESS NOTE
Addendum entered and electronically signed by NEHA REYES MD  01/18/19 17:39: 





Patient seen and examined on 1/18/2019. Agree with A/P and recommendations as 

stated. 





Liver enzymes trending down. Expect continued downtrending with acute hep b 

however, will need follow up in clinic. This was strongly expressed to the 

patient. 








Original Note:








Assessment and Plan


1.hematemesis


-H/H WNL 


-continue to monitor H/H and transfuse as needed


-no active signs of bleeding 


-etiology-likely 2/2 M-W tear vs esophagitis vs other


-no plans for EGD at this time unless overt bleeding develops given no clinical 

evidence of significant GI bleeding 


-continue PPI and supportive care


2.acute hepatitis B infection


-INR 1.14 (suggesting good synthetic liver function)


-LFTs-trending down ([place] 3.30, , ALT 1352, alk phos 188)


-hepatitis Bs Ag and hepatitis B core IgM Ab positive (further hep B serologies 

pending)


-HIV negative 


-abd CT w/o acute findings (liver normal)


-ID following-no recommendations for antiviral therapy at this time (may be 

self-limiting and can spontaneously clear)- further workup/management as outpa

tient


-clinically, patient is stable w/o GI complaints. Denies abd pain or N/V. 

Diarrhea resolved. Tolerating diet. 


-patient is okay to be discharged per GI standpoint with follow up in clinic ~ 2

weeks (need for f/u discussed with patient with understanding voiced; office 

information/card given to patient)


-will sign off, please call if needed











Subjective


Date of service: 01/18/19


Principal diagnosis: hematemesis


Interval history: 


Patient w/o distress. No active signs of bleeding overnight or this morning. 

Denies abd pain, N/V, or diarrhea. Tolerating diet. 








Objective





- Constitutional


Vitals: 


                                        











Temp Pulse Resp BP Pulse Ox


 


 98.1 F   67   16   115/67   99 


 


 01/18/19 05:09  01/18/19 05:09  01/18/19 05:09  01/18/19 05:09  01/18/19 05:09











General appearance: no acute distress





- Respiratory


Respiratory: bilateral: CTA





- Cardiovascular


Rhythm: regular


Heart Sounds: Present: S1 & S2





- Gastrointestinal


General gastrointestinal: Present: soft, non-tender, non-distended, normal bowel

sounds





- Neurologic


Neurological: alert and oriented x3





- Labs


CBC & Chem 7: 


                                 01/18/19 05:25





                                 01/18/19 05:25


Labs: 


                         Laboratory Results - last 24 hr











  01/17/19 01/17/19 01/18/19





  13:48 13:48 05:25


 


WBC    7.4


 


RBC    3.89


 


Hgb    12.8


 


Hct    38.2


 


MCV    98 H


 


MCH    33 H


 


MCHC    34


 


RDW    14.1


 


Plt Count    219


 


Lymph % (Auto)    32.1


 


Mono % (Auto)    14.9 H


 


Eos % (Auto)    10.6 H


 


Baso % (Auto)    0.4


 


Lymph #    2.4


 


Mono #    1.1 H


 


Eos #    0.8 H


 


Baso #    0.0


 


Seg Neutrophils %    42.0


 


Seg Neutrophils #    3.1


 


PT   


 


INR   


 


Sodium   137 


 


Potassium   3.9 


 


Chloride   99.4 


 


Carbon Dioxide   26 


 


Anion Gap   16 


 


BUN   8 L 


 


Creatinine   0.7 L 


 


Estimated GFR   > 60 


 


BUN/Creatinine Ratio   11 


 


Glucose   81 


 


Calcium   8.8 


 


Total Bilirubin   5.60 H 


 


AST   1173 H 


 


ALT   1660 H 


 


Alkaline Phosphatase   195 H 


 


Total Protein   7.2 


 


Albumin   3.4 L 


 


Albumin/Globulin Ratio   0.9 


 


HIV 1&2 Antibody Rapid  Non react  


 


HIV P24 Antigen  Non react  














  01/18/19 01/18/19





  05:25 05:25


 


WBC  


 


RBC  


 


Hgb  


 


Hct  


 


MCV  


 


MCH  


 


MCHC  


 


RDW  


 


Plt Count  


 


Lymph % (Auto)  


 


Mono % (Auto)  


 


Eos % (Auto)  


 


Baso % (Auto)  


 


Lymph #  


 


Mono #  


 


Eos #  


 


Baso #  


 


Seg Neutrophils %  


 


Seg Neutrophils #  


 


PT  15.0 H 


 


INR  1.14 H 


 


Sodium   137


 


Potassium   3.7


 


Chloride   101.0


 


Carbon Dioxide   27


 


Anion Gap   13


 


BUN   9


 


Creatinine   0.8


 


Estimated GFR   > 60


 


BUN/Creatinine Ratio   11


 


Glucose   88


 


Calcium   8.4


 


Total Bilirubin   3.30 H


 


AST   842 H


 


ALT   1352 H


 


Alkaline Phosphatase   188 H


 


Total Protein   6.1 L


 


Albumin   3.2 L


 


Albumin/Globulin Ratio   1.1


 


HIV 1&2 Antibody Rapid  


 


HIV P24 Antigen